# Patient Record
Sex: MALE | Race: BLACK OR AFRICAN AMERICAN | Employment: OTHER | URBAN - METROPOLITAN AREA
[De-identification: names, ages, dates, MRNs, and addresses within clinical notes are randomized per-mention and may not be internally consistent; named-entity substitution may affect disease eponyms.]

---

## 2017-01-03 ENCOUNTER — TELEPHONE (OUTPATIENT)
Dept: INTERNAL MEDICINE CLINIC | Facility: CLINIC | Age: 82
End: 2017-01-03

## 2017-03-22 RX ORDER — TERAZOSIN 5 MG/1
CAPSULE ORAL
Qty: 90 CAPSULE | Refills: 0 | OUTPATIENT
Start: 2017-03-22

## 2017-04-12 RX ORDER — TERAZOSIN 5 MG/1
CAPSULE ORAL
Qty: 90 CAPSULE | Refills: 0 | OUTPATIENT
Start: 2017-04-12

## 2017-06-21 ENCOUNTER — TELEPHONE (OUTPATIENT)
Dept: INTERNAL MEDICINE CLINIC | Facility: CLINIC | Age: 82
End: 2017-06-21

## 2017-06-21 DIAGNOSIS — N40.0 HYPERPLASIA OF PROSTATE WITHOUT LOWER URINARY TRACT SYMPTOMS (LUTS): Primary | ICD-10-CM

## 2018-10-31 ENCOUNTER — OFFICE VISIT (OUTPATIENT)
Dept: INTERNAL MEDICINE CLINIC | Facility: CLINIC | Age: 83
End: 2018-10-31
Payer: COMMERCIAL

## 2018-10-31 ENCOUNTER — TELEPHONE (OUTPATIENT)
Dept: INTERNAL MEDICINE CLINIC | Facility: CLINIC | Age: 83
End: 2018-10-31

## 2018-10-31 VITALS
SYSTOLIC BLOOD PRESSURE: 138 MMHG | TEMPERATURE: 98 F | RESPIRATION RATE: 12 BRPM | HEART RATE: 73 BPM | WEIGHT: 133 LBS | BODY MASS INDEX: 19.04 KG/M2 | OXYGEN SATURATION: 99 % | DIASTOLIC BLOOD PRESSURE: 76 MMHG | HEIGHT: 70 IN

## 2018-10-31 DIAGNOSIS — I25.84 CORONARY ARTERY CALCIFICATION: ICD-10-CM

## 2018-10-31 DIAGNOSIS — R39.15 URINARY URGENCY: ICD-10-CM

## 2018-10-31 DIAGNOSIS — I25.10 CORONARY ARTERY CALCIFICATION: ICD-10-CM

## 2018-10-31 DIAGNOSIS — H25.9 SENILE CATARACT, UNSPECIFIED AGE-RELATED CATARACT TYPE, UNSPECIFIED LATERALITY: ICD-10-CM

## 2018-10-31 DIAGNOSIS — Z00.00 ANNUAL PHYSICAL EXAM: Primary | ICD-10-CM

## 2018-10-31 PROCEDURE — 90732 PPSV23 VACC 2 YRS+ SUBQ/IM: CPT | Performed by: INTERNAL MEDICINE

## 2018-10-31 PROCEDURE — 90472 IMMUNIZATION ADMIN EACH ADD: CPT | Performed by: INTERNAL MEDICINE

## 2018-10-31 PROCEDURE — 90653 IIV ADJUVANT VACCINE IM: CPT | Performed by: INTERNAL MEDICINE

## 2018-10-31 PROCEDURE — 99397 PER PM REEVAL EST PAT 65+ YR: CPT | Performed by: INTERNAL MEDICINE

## 2018-10-31 PROCEDURE — 90471 IMMUNIZATION ADMIN: CPT | Performed by: INTERNAL MEDICINE

## 2018-10-31 RX ORDER — TERAZOSIN 2 MG/1
CAPSULE ORAL
Qty: 180 CAPSULE | Refills: 0 | Status: SHIPPED | OUTPATIENT
Start: 2018-10-31 | End: 2019-03-02

## 2018-10-31 RX ORDER — TERAZOSIN 2 MG/1
2 CAPSULE ORAL 2 TIMES DAILY
Qty: 60 CAPSULE | Refills: 0 | Status: SHIPPED | OUTPATIENT
Start: 2018-10-31 | End: 2018-10-31

## 2018-11-01 NOTE — PROGRESS NOTES
HPI:    Patient ID: Moi Herring is a 80year old male.     HPI  Patient comes in today for annual physical exam.  Only complaint patient has today is that lately he has been having more more urinary urgency where he has to go or he would urinate on himsel Tab EC Take  by mouth.  Disp:  Rfl:      Allergies:No Known Allergies    HISTORY:  Past Medical History:   Diagnosis Date   • BPH (benign prostatic hypertrophy)    • Glaucoma       Past Surgical History:   Procedure Laterality Date   • BACK SURGERY  1974 urgency- will restart meds and refer to urologist   Senile cataract, unspecified age-related cataract type, unspecified laterality- to follow with eye specialist   Coronary artery calcification- pt denies any cp or sob byut he does not really exert himself

## 2018-11-01 NOTE — PATIENT INSTRUCTIONS
ASSESSMENT/PLAN:   Annual physical exam  (primary encounter diagnosis)exam as above, will order labs   Urinary urgency- will restart meds and refer to urologist   Senile cataract, unspecified age-related cataract type, unspecified laterality- to follow wit

## 2018-11-08 ENCOUNTER — LAB ENCOUNTER (OUTPATIENT)
Dept: LAB | Facility: HOSPITAL | Age: 83
End: 2018-11-08
Attending: INTERNAL MEDICINE
Payer: COMMERCIAL

## 2018-11-08 DIAGNOSIS — I25.84 CORONARY ARTERY CALCIFICATION: ICD-10-CM

## 2018-11-08 DIAGNOSIS — R39.15 URINARY URGENCY: ICD-10-CM

## 2018-11-08 DIAGNOSIS — I25.10 CORONARY ARTERY CALCIFICATION: ICD-10-CM

## 2018-11-08 DIAGNOSIS — H25.9 SENILE CATARACT, UNSPECIFIED AGE-RELATED CATARACT TYPE, UNSPECIFIED LATERALITY: ICD-10-CM

## 2018-11-08 DIAGNOSIS — Z00.00 ANNUAL PHYSICAL EXAM: ICD-10-CM

## 2018-11-08 PROCEDURE — 36415 COLL VENOUS BLD VENIPUNCTURE: CPT

## 2018-11-08 PROCEDURE — 80061 LIPID PANEL: CPT

## 2018-11-08 PROCEDURE — 85025 COMPLETE CBC W/AUTO DIFF WBC: CPT

## 2018-11-08 PROCEDURE — 84443 ASSAY THYROID STIM HORMONE: CPT

## 2018-11-08 PROCEDURE — 80053 COMPREHEN METABOLIC PANEL: CPT

## 2018-11-08 PROCEDURE — 81001 URINALYSIS AUTO W/SCOPE: CPT

## 2018-11-09 DIAGNOSIS — Z12.11 ENCOUNTER FOR SCREENING COLONOSCOPY: ICD-10-CM

## 2018-11-09 DIAGNOSIS — D64.9 ANEMIA, UNSPECIFIED TYPE: ICD-10-CM

## 2018-11-09 DIAGNOSIS — R71.8 ELEVATED MCV: Primary | ICD-10-CM

## 2018-11-09 RX ORDER — CIPROFLOXACIN 250 MG/1
250 TABLET, FILM COATED ORAL 2 TIMES DAILY
Qty: 14 TABLET | Refills: 0 | Status: SHIPPED | OUTPATIENT
Start: 2018-11-09 | End: 2018-11-16

## 2018-12-03 NOTE — TELEPHONE ENCOUNTER
Dr. Carlie Pierson (unpaid leave) for daughter to care for father. She is requesting intermittent leave of 1-4 days per month starting from 11/27/18 for 12 months. Please sign off on form if you approve:  -Highlight the patient and hit \"Chart\" button.   -I

## 2019-03-04 RX ORDER — TERAZOSIN 2 MG/1
CAPSULE ORAL
Qty: 180 CAPSULE | Refills: 0 | Status: SHIPPED | OUTPATIENT
Start: 2019-03-04 | End: 2019-06-10

## 2019-04-17 ENCOUNTER — TELEPHONE (OUTPATIENT)
Dept: INTERNAL MEDICINE CLINIC | Facility: CLINIC | Age: 84
End: 2019-04-17

## 2019-04-17 NOTE — TELEPHONE ENCOUNTER
Message left for pt on mobile phone and home phone to schedule appt to see Dr. Deanne Garcia, pt overdue for office visit and needs to be seen for medication refills.

## 2019-05-09 ENCOUNTER — TELEPHONE (OUTPATIENT)
Dept: INTERNAL MEDICINE CLINIC | Facility: CLINIC | Age: 84
End: 2019-05-09

## 2019-05-09 NOTE — TELEPHONE ENCOUNTER
Dr Eve Mojica office faxed to the [employer] fax a surgical clearance form for Dr GOLD to sign and fax back.   Their are going to fax it again today

## 2019-05-09 NOTE — TELEPHONE ENCOUNTER
Called Megan Mendiola Left a voice mail message regarding this pt that our office left message to pt phone number to call back to schedule an appt  With No results

## 2019-05-13 ENCOUNTER — OFFICE VISIT (OUTPATIENT)
Dept: INTERNAL MEDICINE CLINIC | Facility: CLINIC | Age: 84
End: 2019-05-13
Payer: COMMERCIAL

## 2019-05-13 VITALS
BODY MASS INDEX: 21.52 KG/M2 | HEIGHT: 68 IN | DIASTOLIC BLOOD PRESSURE: 77 MMHG | WEIGHT: 142 LBS | TEMPERATURE: 98 F | SYSTOLIC BLOOD PRESSURE: 153 MMHG | HEART RATE: 71 BPM

## 2019-05-13 DIAGNOSIS — D72.819 LEUKOPENIA, UNSPECIFIED TYPE: ICD-10-CM

## 2019-05-13 DIAGNOSIS — M25.512 CHRONIC LEFT SHOULDER PAIN: ICD-10-CM

## 2019-05-13 DIAGNOSIS — R71.8 ELEVATED MCV: ICD-10-CM

## 2019-05-13 DIAGNOSIS — D64.9 ANEMIA, UNSPECIFIED TYPE: ICD-10-CM

## 2019-05-13 DIAGNOSIS — Z01.818 PRE-OP EVALUATION: Primary | ICD-10-CM

## 2019-05-13 DIAGNOSIS — H25.9 AGE-RELATED CATARACT OF BOTH EYES, UNSPECIFIED AGE-RELATED CATARACT TYPE: ICD-10-CM

## 2019-05-13 DIAGNOSIS — G89.29 CHRONIC LEFT SHOULDER PAIN: ICD-10-CM

## 2019-05-13 PROCEDURE — 36415 COLL VENOUS BLD VENIPUNCTURE: CPT | Performed by: INTERNAL MEDICINE

## 2019-05-13 PROCEDURE — 93000 ELECTROCARDIOGRAM COMPLETE: CPT | Performed by: INTERNAL MEDICINE

## 2019-05-13 PROCEDURE — 99214 OFFICE O/P EST MOD 30 MIN: CPT | Performed by: INTERNAL MEDICINE

## 2019-05-13 NOTE — PROGRESS NOTES
HPI:    Patient ID: Moi Herring is a 80year old male. HPI  Patient here for preop clearance for cataract surgery.   Also has a complaint of left shoulder abnormality he had fallen denies few months ago did not think much of it but now he feels like th prostatic hypertrophy)    • Glaucoma       Past Surgical History:   Procedure Laterality Date   • BACK SURGERY  1974   • ELECTROCARDIOGRAM, COMPLETE      Scanned to media tab - DOS 04-      Family History   Problem Relation Age of Onset   • Hyperten cleared for cataract surgery  Elevated mcv- will check b12 level   Anemia, unspecified type- follow with GI  Chronic left shoulder pain- will order xr and refer to ortho   Leukopenia, unspecified type-follow with hematology    Orders Placed This Encounter

## 2019-05-14 ENCOUNTER — MED REC SCAN ONLY (OUTPATIENT)
Dept: INTERNAL MEDICINE CLINIC | Facility: CLINIC | Age: 84
End: 2019-05-14

## 2019-06-10 RX ORDER — TERAZOSIN 2 MG/1
CAPSULE ORAL
Qty: 180 CAPSULE | Refills: 1 | Status: SHIPPED | OUTPATIENT
Start: 2019-06-10 | End: 2020-02-04

## 2019-06-10 NOTE — TELEPHONE ENCOUNTER
Refill passed per [facility] protocol.   Hypertensive Medications  Protocol Criteria:  · Appointment scheduled in the past 6 months or in the next 3 months  · BMP or CMP in the past 12 months  · Creatinine result < 2  Recent Outpatient Visits

## 2019-11-12 ENCOUNTER — TELEPHONE (OUTPATIENT)
Dept: INTERNAL MEDICINE CLINIC | Facility: CLINIC | Age: 84
End: 2019-11-12

## 2019-11-12 NOTE — TELEPHONE ENCOUNTER
Dr. Theodore Alt,    Please sign off on form: Pt's daughter recert FMLA, to care for pt     -Highlight the patient and hit \"Chart\" button. -In Chart Review, w/in the Encounter tab - click 1 time on the Telephone call encounter for 11/12/19.  Scroll down the tel

## 2019-11-12 NOTE — TELEPHONE ENCOUNTER
FMLA forms for pt's daughter received in Forms Dept. LM for pt daughter, need updated [employer].  Logged for processing

## 2020-01-24 ENCOUNTER — APPOINTMENT (OUTPATIENT)
Dept: GENERAL RADIOLOGY | Facility: HOSPITAL | Age: 85
End: 2020-01-24
Attending: EMERGENCY MEDICINE
Payer: COMMERCIAL

## 2020-01-24 ENCOUNTER — HOSPITAL ENCOUNTER (EMERGENCY)
Facility: HOSPITAL | Age: 85
Discharge: HOME | End: 2020-01-24
Attending: EMERGENCY MEDICINE
Payer: COMMERCIAL

## 2020-01-24 VITALS
TEMPERATURE: 99 F | HEART RATE: 80 BPM | DIASTOLIC BLOOD PRESSURE: 57 MMHG | SYSTOLIC BLOOD PRESSURE: 101 MMHG | OXYGEN SATURATION: 96 % | RESPIRATION RATE: 18 BRPM

## 2020-01-24 DIAGNOSIS — D64.9 ANEMIA, UNSPECIFIED TYPE: ICD-10-CM

## 2020-01-24 DIAGNOSIS — R53.1 WEAKNESS GENERALIZED: Primary | ICD-10-CM

## 2020-01-24 LAB
ANION GAP SERPL CALC-SCNC: 3 MMOL/L (ref 0–18)
BASOPHILS # BLD AUTO: 0.03 X10(3) UL (ref 0–0.2)
BASOPHILS NFR BLD AUTO: 0.4 %
BUN BLD-MCNC: 25 MG/DL (ref 7–18)
BUN/CREAT SERPL: 23.4 (ref 10–20)
CALCIUM BLD-MCNC: 9.1 MG/DL (ref 8.5–10.1)
CHLORIDE SERPL-SCNC: 110 MMOL/L (ref 98–112)
CO2 SERPL-SCNC: 30 MMOL/L (ref 21–32)
CREAT BLD-MCNC: 1.07 MG/DL (ref 0.7–1.3)
DEPRECATED RDW RBC AUTO: 48.5 FL (ref 35.1–46.3)
EOSINOPHIL # BLD AUTO: 0.03 X10(3) UL (ref 0–0.7)
EOSINOPHIL NFR BLD AUTO: 0.4 %
ERYTHROCYTE [DISTWIDTH] IN BLOOD BY AUTOMATED COUNT: 13.1 % (ref 11–15)
GLUCOSE BLD-MCNC: 126 MG/DL (ref 70–99)
HCT VFR BLD AUTO: 24.8 % (ref 39–53)
HGB BLD-MCNC: 8 G/DL (ref 13–17.5)
IMM GRANULOCYTES # BLD AUTO: 0.06 X10(3) UL (ref 0–1)
IMM GRANULOCYTES NFR BLD: 0.7 %
LYMPHOCYTES # BLD AUTO: 1.02 X10(3) UL (ref 1–4)
LYMPHOCYTES NFR BLD AUTO: 12.3 %
MCH RBC QN AUTO: 32.9 PG (ref 26–34)
MCHC RBC AUTO-ENTMCNC: 32.3 G/DL (ref 31–37)
MCV RBC AUTO: 102.1 FL (ref 80–100)
MONOCYTES # BLD AUTO: 0.63 X10(3) UL (ref 0.1–1)
MONOCYTES NFR BLD AUTO: 7.6 %
NEUTROPHILS # BLD AUTO: 6.54 X10 (3) UL (ref 1.5–7.7)
NEUTROPHILS # BLD AUTO: 6.54 X10(3) UL (ref 1.5–7.7)
NEUTROPHILS NFR BLD AUTO: 78.6 %
OSMOLALITY SERPL CALC.SUM OF ELEC: 302 MOSM/KG (ref 275–295)
PLATELET # BLD AUTO: 467 10(3)UL (ref 150–450)
POTASSIUM SERPL-SCNC: 4.1 MMOL/L (ref 3.5–5.1)
RBC # BLD AUTO: 2.43 X10(6)UL (ref 3.8–5.8)
SODIUM SERPL-SCNC: 143 MMOL/L (ref 136–145)
WBC # BLD AUTO: 8.3 X10(3) UL (ref 4–11)

## 2020-01-24 PROCEDURE — 80048 BASIC METABOLIC PNL TOTAL CA: CPT | Performed by: EMERGENCY MEDICINE

## 2020-01-24 PROCEDURE — 96360 HYDRATION IV INFUSION INIT: CPT

## 2020-01-24 PROCEDURE — 85025 COMPLETE CBC W/AUTO DIFF WBC: CPT | Performed by: EMERGENCY MEDICINE

## 2020-01-24 PROCEDURE — 99284 EMERGENCY DEPT VISIT MOD MDM: CPT

## 2020-01-24 PROCEDURE — 71045 X-RAY EXAM CHEST 1 VIEW: CPT | Performed by: EMERGENCY MEDICINE

## 2020-01-24 NOTE — ED INITIAL ASSESSMENT (HPI)
Patient aox2-3 with family co of cough/cough sx x 1 week. Per granddaughter who lives patient states has been decreasing PO intake with increased general weakness. Patient denies pain.

## 2020-01-25 ENCOUNTER — TELEPHONE (OUTPATIENT)
Dept: INTERNAL MEDICINE CLINIC | Facility: CLINIC | Age: 85
End: 2020-01-25

## 2020-01-25 NOTE — TELEPHONE ENCOUNTER
CSS please contact pt to schedule                  Emery Wyatt MD  Em Rn Triage 1 hour ago (10:44 AM)     Pt should follow with me this week after seen in ER

## 2020-01-26 NOTE — ED PROVIDER NOTES
Patient Seen in: [facility] Emergency Department    History   Patient presents with:  Weakness  Cough/URI      HPI    Patient presents to the ED with family for decreased appetite and generalized weakness over the past week or so.   They deny fever (37.3 °C)   Temp src Oral   SpO2 100 %   O2 Device None (Room air)       Physical Exam   Constitutional: He is oriented to person, place, and time. He appears well-developed and well-nourished. No distress. HENT:   Head: Normocephalic and atraumatic.    E GREEN   RAINBOW DRAW GOLD         Imaging Results Available and Reviewed while in ED:    Xr Chest Ap Portable  (cpt=71045)    Result Date: 1/24/2020  CONCLUSION:  Development of left basilar opacity concerning for pneumonia in the appropriate clinical sett advised. Additional verbal instructions and return precautions were discussed with the patient and/or caregiver.       Condition upon leaving the department: Stable    Disposition and Plan     Clinical Impression:  Weakness generalized  (primary encounte

## 2020-01-27 NOTE — TELEPHONE ENCOUNTER
Call received from pt's daughter Dutch Salvador . She reiterated the ER visit last week and understand that pt needs to follow up with Dr Cathryn Oppenheim. The problem is does not want to schedule appt. She is asking for advise of what to do.  Informed daughter will try

## 2020-01-28 NOTE — TELEPHONE ENCOUNTER
Ethan Xiong daughter advised of Dr Canchola Read note below. Appointment made for today at 1:30pm with Dr Jose Williamson at the [facility] office. Niece will get patient dressed now and will bring him to office.

## 2020-01-28 NOTE — TELEPHONE ENCOUNTER
Hi, can you try to convince Mr Damien Landau to come and see us, I think he knows you better and maybe you can convince him , let me know

## 2020-02-04 ENCOUNTER — TELEPHONE (OUTPATIENT)
Dept: NEUROLOGY | Facility: CLINIC | Age: 85
End: 2020-02-04

## 2020-02-04 ENCOUNTER — LAB ENCOUNTER (OUTPATIENT)
Dept: LAB | Facility: HOSPITAL | Age: 85
End: 2020-02-04
Attending: Other
Payer: COMMERCIAL

## 2020-02-04 DIAGNOSIS — F03.90 DEMENTIA WITHOUT BEHAVIORAL DISTURBANCE, UNSPECIFIED DEMENTIA TYPE (HCC): ICD-10-CM

## 2020-02-04 LAB
FOLATE SERPL-MCNC: 11.5 NG/ML (ref 8.7–?)
T4 FREE SERPL-MCNC: 1.3 NG/DL (ref 0.8–1.7)
TSI SER-ACNC: 3.84 MIU/ML (ref 0.36–3.74)
VIT B12 SERPL-MCNC: 370 PG/ML (ref 193–986)

## 2020-02-04 PROCEDURE — 84443 ASSAY THYROID STIM HORMONE: CPT

## 2020-02-04 PROCEDURE — 84439 ASSAY OF FREE THYROXINE: CPT

## 2020-02-04 PROCEDURE — 87389 HIV-1 AG W/HIV-1&-2 AB AG IA: CPT

## 2020-02-04 PROCEDURE — 82607 VITAMIN B-12: CPT

## 2020-02-04 PROCEDURE — 36415 COLL VENOUS BLD VENIPUNCTURE: CPT

## 2020-02-04 PROCEDURE — 82746 ASSAY OF FOLIC ACID SERUM: CPT

## 2020-02-04 PROCEDURE — 86780 TREPONEMA PALLIDUM: CPT

## 2020-02-04 NOTE — PROGRESS NOTES
Neurology Initial Visit     Referred By: Dr. Meadows ref. provider found    Chief Complaint: Patient presents with:  Memory Loss: Patient here for memory loss.  States that this past month patient's memory worse, forgetting to eat, still driving and getting lo total) by mouth nightly., Disp: 90 tablet, Rfl: 3  •  amoxicillin 875 MG Oral Tab, Take 1 tablet (875 mg total) by mouth 2 (two) times daily for 10 days. , Disp: 20 tablet, Rfl: 0  •  aspirin (ASPIR-81) 81 MG Oral Tab EC, Take  by mouth., Disp: , Rfl:   • posture  Normal physiologic      Labs:    Lab Results   Component Value Date    TSH 2.11 11/08/2018     Lab Results   Component Value Date     11/08/2018    LDL 86 11/08/2018    TRIG 39 11/08/2018     Lab Results   Component Value Date    HGB 8.0 (L

## 2020-02-04 NOTE — TELEPHONE ENCOUNTER
Called [facility] BS AAMIR for authorization of approval of MRI brain wo cpt code 24530. Talked to [address]. who states RQI is not required because Pt. has Medicare Part A. Reference # [id number]   Will call Pt. To inform.  Amy Daily informed no authorization is requir

## 2020-02-05 ENCOUNTER — TELEPHONE (OUTPATIENT)
Dept: NEUROLOGY | Facility: CLINIC | Age: 85
End: 2020-02-05

## 2020-02-05 ENCOUNTER — TELEPHONE (OUTPATIENT)
Dept: INTERNAL MEDICINE CLINIC | Facility: CLINIC | Age: 85
End: 2020-02-05

## 2020-02-05 LAB — T PALLIDUM AB SER QL: NEGATIVE

## 2020-02-05 NOTE — TELEPHONE ENCOUNTER
Spoke to patient's daughter and notified her of below. She was understanding and thankful for the call.

## 2020-02-05 NOTE — TELEPHONE ENCOUNTER
Dr. Jacquelyn Mitchell,    Pt saw Neurology yesterday and daughter states that she will need more days per month. She's asking for 10 days per month instead of the original 5 approved in November to care for father since he needs 24/7 care.  Do you approve 10 days per m

## 2020-02-05 NOTE — TELEPHONE ENCOUNTER
----- Message from Shreya Hutson MD sent at 2/5/2020  7:19 AM CST -----  Please let the patient know that results of these particular lab tests so far were normal.    Thank you

## 2020-02-10 ENCOUNTER — TELEPHONE (OUTPATIENT)
Dept: NEUROLOGY | Facility: CLINIC | Age: 85
End: 2020-02-10

## 2020-02-10 NOTE — TELEPHONE ENCOUNTER
Spoke to daughter Ashely Monique and relayed 's message to make an appt and offered to transfer her call to make the appt but she said she will call later and book an appt.  Told daughter to call the Forms dept after the appt so we can re-visit her FMLA request

## 2020-02-11 ENCOUNTER — TELEPHONE (OUTPATIENT)
Dept: NEUROLOGY | Facility: CLINIC | Age: 85
End: 2020-02-11

## 2020-02-13 NOTE — TELEPHONE ENCOUNTER
Patient will have as of today ( home health aid, dietitian and a medical social worker). Nikki Zhong is calling from [facility] [employer]. Pts blood pressure today 96/50. Just Rah Parra) [phone]  Any questions you can call her.

## 2020-02-18 ENCOUNTER — MED REC SCAN ONLY (OUTPATIENT)
Dept: NEUROLOGY | Facility: CLINIC | Age: 85
End: 2020-02-18

## 2020-02-20 ENCOUNTER — HOSPITAL ENCOUNTER (OUTPATIENT)
Dept: MRI IMAGING | Facility: HOSPITAL | Age: 85
Discharge: HOME | End: 2020-02-20
Attending: Other
Payer: COMMERCIAL

## 2020-02-20 ENCOUNTER — TELEPHONE (OUTPATIENT)
Dept: NEUROLOGY | Facility: CLINIC | Age: 85
End: 2020-02-20

## 2020-02-20 DIAGNOSIS — F03.90 DEMENTIA WITHOUT BEHAVIORAL DISTURBANCE, UNSPECIFIED DEMENTIA TYPE (HCC): ICD-10-CM

## 2020-02-20 PROBLEM — E63.9 VERY POOR NUTRITION: Status: ACTIVE | Noted: 2020-02-20

## 2020-02-20 PROBLEM — R53.1 GENERALIZED WEAKNESS: Status: ACTIVE | Noted: 2020-02-20

## 2020-02-20 PROBLEM — Z91.81 AT MAXIMUM RISK FOR FALL: Status: ACTIVE | Noted: 2020-02-20

## 2020-02-20 PROBLEM — F03.C0 ADVANCED DEMENTIA (HCC): Status: ACTIVE | Noted: 2020-02-20

## 2020-02-20 PROBLEM — R26.2 DIFFICULTY WALKING: Status: ACTIVE | Noted: 2020-02-20

## 2020-02-20 PROCEDURE — 70551 MRI BRAIN STEM W/O DYE: CPT | Performed by: OTHER

## 2020-02-20 NOTE — TELEPHONE ENCOUNTER
Spoke to Davida Parra who states that patient's family is trying to get home caregiver and will try and get MRI done today. Dr. Hawk Kelly notified.

## 2020-02-21 ENCOUNTER — TELEPHONE (OUTPATIENT)
Dept: NEUROLOGY | Facility: CLINIC | Age: 85
End: 2020-02-21

## 2020-02-21 ENCOUNTER — TELEPHONE (OUTPATIENT)
Dept: INTERNAL MEDICINE CLINIC | Facility: CLINIC | Age: 85
End: 2020-02-21

## 2020-02-21 NOTE — PROGRESS NOTES
HPI:    Patient ID: Abby Cardona is a 80year old male. HPI  Patient brought in today by daughter and granddaughter for evaluation after he was seen in ER and due to ongoing symptoms of advanced dementia.   Patient was seen by neurology Dr. Genevieve Morrison  of (5 mg total) by mouth nightly. 90 tablet 3   • aspirin (ASPIR-81) 81 MG Oral Tab EC Take  by mouth. • latanoprost (XALATAN) 0.005 % Ophthalmic Solution Apply  to eye.        Allergies:No Known Allergies    HISTORY:  Past Medical History:   Diagnosis Juwan normal. Judgment and thought content normal.   Nursing note and vitals reviewed.            ASSESSMENT/PLAN:   Advanced dementia (hcc)  (primary encounter diagnosis)-see neurology notes seen was started on Aricept  Very poor nutrition-shin is been seen by dg

## 2020-02-21 NOTE — TELEPHONE ENCOUNTER
----- Message from Chiara Sigala MD sent at 2/21/2020 10:59 AM CST -----  Please let patient know that MRI brain didn't show significant abnormalities.   Have them follow up after neuropsych is done

## 2020-02-21 NOTE — TELEPHONE ENCOUNTER
Called and spoke with [facility] with test results. States neuropsych testing will be in March. Asked for LOV notes.     Placed in front office for pickup

## 2020-02-21 NOTE — TELEPHONE ENCOUNTER
Spoke with daughter Cesilia Sparrow (TOSIN and  verified)--forgot to ask Dr. Roseann Livingston at office visit yesterday if he would fill out paperwork documenting patient's dementia so that she can become POA.     She can drop off paperwork at office if you are agreeable    P

## 2020-02-24 ENCOUNTER — TELEPHONE (OUTPATIENT)
Dept: INTERNAL MEDICINE CLINIC | Facility: CLINIC | Age: 85
End: 2020-02-24

## 2020-02-24 NOTE — TELEPHONE ENCOUNTER
Well we see Geriatric pt in pour office, we don't have a geriatric MD. But if they want they can look for someone and sometimes they do come to the house to see pt\"s

## 2020-02-24 NOTE — TELEPHONE ENCOUNTER
I called Cesilia Sparrow and inform her of message below. She stated he dad is 80years old and would like a doctor who specializes in geriatric. She was asking if a referral was needed.   I instructed they are internest as Dr. Roseann Livingston and no referral would be nee

## 2020-02-24 NOTE — TELEPHONE ENCOUNTER
Pt's family member called requesting the name of a Geriatric Physician. Family member's name is Elvira Teresa, [phone].

## 2020-02-25 NOTE — TELEPHONE ENCOUNTER
Dr. Jacquelyn Mitchell,     Pt saw Neurology and daughter states that she will need more days per month. She's asking for 10 days per month instead of the original 5 approved in November to care for father since he needs 24/7 care.  Do you approve 10 days per month for

## 2020-03-11 ENCOUNTER — MED REC SCAN ONLY (OUTPATIENT)
Dept: NEUROLOGY | Facility: CLINIC | Age: 85
End: 2020-03-11

## 2020-03-16 ENCOUNTER — TELEPHONE (OUTPATIENT)
Dept: NEUROLOGY | Facility: CLINIC | Age: 85
End: 2020-03-16

## 2020-03-16 NOTE — TELEPHONE ENCOUNTER
Called [facility] at [phone], spoke Estelita, supervisor and explained below. She will get the patient discharged and case closed. Called patient's daughter and notified her of above. She was understanding and thankful for the call.

## 2020-03-16 NOTE — TELEPHONE ENCOUNTER
Per Skye Buchanan (daughter) she needs his home health claim closed by Dr Estrella Garay so he can be transferred to the rehab facility. Medicare told her Dr Gibson Horn has to close this claim. He is currently at the [facility].  Please call to discuss further in detail.

## 2020-03-21 ENCOUNTER — APPOINTMENT (OUTPATIENT)
Dept: CT IMAGING | Facility: HOSPITAL | Age: 85
End: 2020-03-21
Attending: EMERGENCY MEDICINE
Payer: COMMERCIAL

## 2020-03-21 ENCOUNTER — HOSPITAL ENCOUNTER (EMERGENCY)
Facility: HOSPITAL | Age: 85
Discharge: HOME | End: 2020-03-21
Attending: EMERGENCY MEDICINE
Payer: COMMERCIAL

## 2020-03-21 VITALS
DIASTOLIC BLOOD PRESSURE: 75 MMHG | WEIGHT: 132 LBS | HEIGHT: 71 IN | HEART RATE: 84 BPM | RESPIRATION RATE: 18 BRPM | OXYGEN SATURATION: 100 % | SYSTOLIC BLOOD PRESSURE: 144 MMHG | TEMPERATURE: 98 F | BODY MASS INDEX: 18.48 KG/M2

## 2020-03-21 DIAGNOSIS — S09.90XA INJURY OF HEAD, INITIAL ENCOUNTER: Primary | ICD-10-CM
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
## 2020-03-21 PROCEDURE — 99284 EMERGENCY DEPT VISIT MOD MDM: CPT

## 2020-03-21 PROCEDURE — 70450 CT HEAD/BRAIN W/O DYE: CPT | Performed by: EMERGENCY MEDICINE

## 2020-03-21 NOTE — ED PROVIDER NOTES
Patient Seen in: [facility] AND [facility] Emergency Department      History   Patient presents with:  Fall    Stated Complaint: fall with head inj    HPI    69-year-old male with history of BPH and presently on rehabilitation for recent pneumonia presents post no evidence of external or internal trauma by exam.  Neurological: Speech normal.  Cranial nerves II through XII intact. No focal motor or sensory deficits to extremities x4. Skin: No laceration or abrasions.   Musculoskeletal                Head: atrauma

## 2020-03-21 NOTE — ED NOTES
Patient ao2 per baseline, co of unwitnessed fall x today at [facility]. Patient states had mechanical fall with head injury. Denies loc no obvious signs of deformity. Cms intact. Denies n/v denies dizziness, patient has no complaints.      Bed locked

## 2020-04-10 ENCOUNTER — APPOINTMENT (OUTPATIENT)
Dept: CT IMAGING | Facility: HOSPITAL | Age: 85
End: 2020-04-10
Attending: EMERGENCY MEDICINE
Payer: COMMERCIAL

## 2020-04-10 ENCOUNTER — HOSPITAL ENCOUNTER (EMERGENCY)
Facility: HOSPITAL | Age: 85
Discharge: HOME | End: 2020-04-11
Attending: EMERGENCY MEDICINE
Payer: COMMERCIAL

## 2020-04-10 DIAGNOSIS — S09.90XA INJURY OF HEAD, INITIAL ENCOUNTER: ICD-10-CM
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
## 2020-04-10 DIAGNOSIS — S01.81XA FOREHEAD LACERATION, INITIAL ENCOUNTER: Primary | ICD-10-CM
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
## 2020-04-10 PROCEDURE — 82962 GLUCOSE BLOOD TEST: CPT

## 2020-04-10 PROCEDURE — 93005 ELECTROCARDIOGRAM TRACING: CPT

## 2020-04-10 PROCEDURE — 99284 EMERGENCY DEPT VISIT MOD MDM: CPT

## 2020-04-10 PROCEDURE — 93010 ELECTROCARDIOGRAM REPORT: CPT | Performed by: EMERGENCY MEDICINE

## 2020-04-10 PROCEDURE — 12013 RPR F/E/E/N/L/M 2.6-5.0 CM: CPT

## 2020-04-10 PROCEDURE — 70450 CT HEAD/BRAIN W/O DYE: CPT | Performed by: EMERGENCY MEDICINE

## 2020-04-10 PROCEDURE — 72125 CT NECK SPINE W/O DYE: CPT | Performed by: EMERGENCY MEDICINE

## 2020-04-10 RX ORDER — MULTIVITAMIN
TABLET ORAL
COMMUNITY

## 2020-04-10 RX ORDER — ACETAMINOPHEN 325 MG/1
650 TABLET ORAL EVERY 6 HOURS PRN
COMMUNITY

## 2020-04-10 RX ORDER — AMOXICILLIN 250 MG
1 CAPSULE ORAL DAILY
COMMUNITY

## 2020-04-10 RX ORDER — MELATONIN
100 DAILY
COMMUNITY

## 2020-04-10 RX ORDER — FOLIC ACID 1 MG/1
TABLET ORAL DAILY
COMMUNITY

## 2020-04-10 RX ORDER — POTASSIUM, SODIUM PHOSPHATES 280 MG-160 MG-250 MG ORAL POWDER PACKET
POWDER IN PACKET
COMMUNITY

## 2020-04-11 VITALS
OXYGEN SATURATION: 100 % | DIASTOLIC BLOOD PRESSURE: 73 MMHG | HEART RATE: 77 BPM | SYSTOLIC BLOOD PRESSURE: 140 MMHG | RESPIRATION RATE: 16 BRPM | TEMPERATURE: 97 F

## 2020-04-11 NOTE — ED NOTES
Unwitnessed fall at [facility], unknown LOC. Laceration to lt eyebrow. Pt a&ox2 per norm. Denies any other complaints.

## 2020-04-11 NOTE — ED INITIAL ASSESSMENT (HPI)
Pt from [facility], here for an unwitnessed fall tonight at 2030. Pt is a/o x2/4, hx dementia. Lac above left eye.  No blood thinner use per pt med list.

## 2020-04-11 NOTE — ED PROVIDER NOTES
[facility]  ICU Multidisciplinary Bedside Rounds   SUMMARY     Date: 10/4/2019    Prehospitalization: otherAssisted living facility  Admit Date / LOS : 10/3/2019/ 1 days    Diagnosis: Hypertensive emergency    Consults:        Active: Nephro       Needed: N/A     Code Status: Full Code   Advanced Directive: <no information>    LDA: Plaza       Central Lines/Site/Justification:NA       Urinary Cath/Order/Justification:Critically Ill in ICU    Vasopressors/Infusions:    nitroGLYCERIN Stopped (10/03/19 1235)          GOALS: Volume/ Hemodynamic: SBP <160                     RASS: 0  alert and calm    CAM ICU: N/A  Pain Management: none       Pain Controlled: not applicable     Rhythm: NSR    Respiratory Device: Nasal Cannula    Oxygen Concentration (%):  [45] 45             Most Recent SBT/ SAT: N/A       MOVE Screen: PT Consult    VTE Prophylaxis: Mechanical  Mobility: Bedrest  Stress Ulcer Prophylaxis: Yes    Dietary: PO  Tolerance: not applicable  /  Advancement: @ goal    Isolation: No active isolations    Restraints: No    Significant Dates:  Post Op Date: N/A  Rescue Date: N/A  Imaging/ Diagnostics: N/A    Noteworthy Labs:      CBC/Anemia Labs: Coags:    Recent Labs   Lab 10/03/19  0224 10/04/19  0259 10/04/19  0300 10/04/19  0333   WBC 10.24 6.27  --   --    HGB 9.5* 7.8*  --   --    HCT 29.6* 24.2*  --   --     251  --   --    MCV 93 92  --   --    RDW 13.6 13.9  --   --    IRON  --   --  27*  --    FERRITIN  --   --   --  184    Recent Labs   Lab 10/03/19  0224   INR 0.9        Chemistries:   Recent Labs   Lab 10/03/19  0224 10/04/19  0300    137   K 3.9 3.6    106   CO2 23 23   BUN 37* 39*   CREATININE 3.5* 3.5*   CALCIUM 8.1* 7.8*   PROT 6.1  --    BILITOT 0.2  --    ALKPHOS 193*  --    ALT 40  --    AST 64*  --    PHOS  --  4.6*        Cardiac Enzymes: Ejection Fractions:    Recent Labs     10/03/19  0224 10/03/19  0742 10/03/19  1334   TROPONINI 0.052* 0.209* 0.259*     Patient Seen in: [facility] Emergency Department      History   Patient presents with:  Fall    Stated Complaint:     HPI    The patient is an 80-year-old male with a history of dementia sent from nursing home after an unwitnessed fall around 8:30 Head: Normocephalic. Laceration present. No raccoon eyes, Hurt's sign or contusion (No swelling or ecchymosis). Right Ear: Tympanic membrane and external ear normal. No hemotympanum.       Left Ear: Tympanic membrane and external ear normal. No No results found for: EF     POCT Glucose: HbA1c:    Recent Labs   Lab 10/03/19  0225 10/03/19  1128 10/03/19  1705 10/03/19  2154   POCTGLUCOSE 268* 239* 164* 211*    Hemoglobin A1C   Date Value Ref Range Status   10/03/2019 7.5 (H) 4.0 - 5.6 % Final     Comment:     ADA Screening Guidelines:  5.7-6.4%  Consistent with prediabetes  >or=6.5%  Consistent with diabetes  High levels of fetal hemoglobin interfere with the HbA1C  assay. Heterozygous hemoglobin variants (HbS, HgC, etc)do  not significantly interfere with this assay.   However, presence of multiple variants may affect accuracy.     04/11/2019 7.1 (H) 4.0 - 5.6 % Final     Comment:     ADA Screening Guidelines:  5.7-6.4%  Consistent with prediabetes  >or=6.5%  Consistent with diabetes  High levels of fetal hemoglobin interfere with the HbA1C  assay. Heterozygous hemoglobin variants (HbS, HgC, etc)do  not significantly interfere with this assay.   However, presence of multiple variants may affect accuracy.          Needs from Care Team: none     ICU LOS 23h  Level of Care: OK to Transfer   Sensory: No sensory deficit. Motor: No weakness or abnormal muscle tone. Deep Tendon Reflexes: Reflexes are normal and symmetric. Comments: Pleasantly confused oriented to self   Psychiatric:         Behavior: Behavior is cooperative. Ger Baker M.D.       Radiology exams  Viewed and reviewed by myself and findings discussed with patient including need for follow up                Disposition and Plan     Clinical Impression:  Forehead laceration, initial encounter  (primary encoun

## 2020-06-24 ENCOUNTER — TELEPHONE (OUTPATIENT)
Dept: NEUROLOGY | Facility: CLINIC | Age: 85
End: 2020-06-24

## 2020-06-24 NOTE — TELEPHONE ENCOUNTER
Per Everett Laceylist daughter -POA she needs a letter stating his mental status, she stated Dr Jovanni Chambers diagnosis him. Any questions please call her. She wants to physically  the letter when its ready.  Carley

## 2021-03-12 DIAGNOSIS — Z23 NEED FOR VACCINATION: ICD-10-CM

## (undated) NOTE — ED AVS SNAPSHOT
Lise Faulkner   MRN: [id number]    Department:  [facility] Emergency Department   Date of Visit:  1/24/2020           Disclosure     Insurance plans vary and the physician(s) referred by the ER may not be covered by your plan.  Please contact yo within the next three months to obtain basic health screening including reassessment of your blood pressure.     IF THERE IS ANY CHANGE OR WORSENING OF YOUR CONDITION, CALL YOUR PRIMARY CARE PHYSICIAN AT ONCE OR RETURN IMMEDIATELY TO THE EMERGENCY DEPARTMEN

## (undated) NOTE — LETTER
20          Blanca Connor  :  1935      To Whom It May Concern: This patient is under my care for diagnosis of moderate Dementia. He will need supervision due to his condition.        If this office may be of further assistance, please d

## (undated) NOTE — ED AVS SNAPSHOT
Kenny Genao   MRN: [id number]    Department:  [facility] Emergency Department   Date of Visit:  3/21/2020           Disclosure     Insurance plans vary and the physician(s) referred by the ER may not be covered by your plan.  Please contact yo within the next three months to obtain basic health screening including reassessment of your blood pressure.     IF THERE IS ANY CHANGE OR WORSENING OF YOUR CONDITION, CALL YOUR PRIMARY CARE PHYSICIAN AT ONCE OR RETURN IMMEDIATELY TO THE EMERGENCY DEPARTMEN